# Patient Record
Sex: FEMALE | Race: WHITE | NOT HISPANIC OR LATINO
[De-identification: names, ages, dates, MRNs, and addresses within clinical notes are randomized per-mention and may not be internally consistent; named-entity substitution may affect disease eponyms.]

---

## 2020-07-14 ENCOUNTER — ERX REFILL RESPONSE (OUTPATIENT)
Age: 60
End: 2020-07-14

## 2020-07-14 RX ORDER — URSODIOL 300 MG/1
TAKE 1 CAPSULE (300 MG) BY ORAL ROUTE 2 TIMES PER DAY FOR 90 DAYS CAPSULE ORAL
Qty: 180 CAPSULE | Refills: 4 | OUTPATIENT

## 2020-07-14 RX ORDER — URSODIOL 300 MG/1
TAKE 1 CAPSULE (300 MG) BY ORAL ROUTE 2 TIMES PER DAY FOR 90 DAYS CAPSULE ORAL
Qty: 180 | Refills: 3 | OUTPATIENT

## 2020-07-31 ENCOUNTER — TELEPHONE ENCOUNTER (OUTPATIENT)
Dept: URBAN - METROPOLITAN AREA CLINIC 78 | Facility: CLINIC | Age: 60
End: 2020-07-31

## 2020-08-03 ENCOUNTER — LAB OUTSIDE AN ENCOUNTER (OUTPATIENT)
Dept: URBAN - METROPOLITAN AREA CLINIC 78 | Facility: CLINIC | Age: 60
End: 2020-08-03

## 2020-08-04 ENCOUNTER — TELEPHONE ENCOUNTER (OUTPATIENT)
Dept: URBAN - METROPOLITAN AREA CLINIC 70 | Facility: CLINIC | Age: 60
End: 2020-08-04

## 2020-08-07 LAB
A/G RATIO: 1.6
ALBUMIN: 4.7
ALKALINE PHOSPHATASE: 107
ALT (SGPT): 42
AST (SGOT): 45
BILIRUBIN, TOTAL: 0.2
BUN/CREATININE RATIO: 20
BUN: 21
CALCIUM: 10.2
CARBON DIOXIDE, TOTAL: 24
CHLORIDE: 98
CREATININE: 1.03
EGFR IF AFRICN AM: 70
EGFR IF NONAFRICN AM: 60
GLOBULIN, TOTAL: 2.9
GLUCOSE: 90
HEMATOCRIT: 41.7
HEMOGLOBIN: 14.2
MCH: 30.9
MCHC: 34.1
MCV: 91
MITOCHONDRIAL (M2) ANTIBODY: 136.2
NRBC: (no result)
PLATELETS: 141
POTASSIUM: 4.3
PROTEIN, TOTAL: 7.6
RBC: 4.59
RDW: 11.4
SJOGREN'S ANTI-SS-A: <0.2
SJOGREN'S ANTI-SS-B: <0.2
SODIUM: 137
WBC: 9.5

## 2020-08-19 ENCOUNTER — OFFICE VISIT (OUTPATIENT)
Dept: URBAN - METROPOLITAN AREA TELEHEALTH 2 | Facility: TELEHEALTH | Age: 60
End: 2020-08-19
Payer: COMMERCIAL

## 2020-08-19 DIAGNOSIS — K74.3 PRIMARY BILIARY CHOLANGITIS: ICD-10-CM

## 2020-08-19 PROCEDURE — G8417 CALC BMI ABV UP PARAM F/U: HCPCS | Performed by: INTERNAL MEDICINE

## 2020-08-19 PROCEDURE — 3017F COLORECTAL CA SCREEN DOC REV: CPT | Performed by: INTERNAL MEDICINE

## 2020-08-19 PROCEDURE — 99214 OFFICE O/P EST MOD 30 MIN: CPT | Performed by: INTERNAL MEDICINE

## 2020-08-19 PROCEDURE — G8427 DOCREV CUR MEDS BY ELIG CLIN: HCPCS | Performed by: INTERNAL MEDICINE

## 2020-08-19 RX ORDER — URSODIOL 300 MG/1
TAKE 1 CAPSULE (300 MG) BY ORAL ROUTE 2 TIMES PER DAY FOR 90 DAYS CAPSULE ORAL
Qty: 180 CAPSULE | Refills: 4 | Status: ACTIVE | COMMUNITY

## 2020-08-19 RX ORDER — AMLODIPINE BESYLATE 5 MG/1
UNK TABLET ORAL
Qty: 0 | Refills: 0 | Status: ACTIVE | COMMUNITY
Start: 1900-01-01

## 2020-08-19 RX ORDER — BUPROPION HYDROCHLORIDE 300 MG/1
TAKE 1 TABLET (300 MG) BY ORAL ROUTE ONCE DAILY TABLET, EXTENDED RELEASE ORAL 1
Qty: 0 | Refills: 0 | Status: ACTIVE | COMMUNITY
Start: 1900-01-01

## 2020-08-19 RX ORDER — URSODIOL 300 MG/1
TAKE 1 CAPSULE (300 MG) BY ORAL ROUTE 2 TIMES PER DAY FOR 90 DAYS CAPSULE ORAL
Qty: 180 CAPSULE

## 2020-08-19 NOTE — HPI-OTHER HISTORIES
Patient seen today via telehealth by agreement and consent of patient in light of current COVID-19 pandemic. I used video conferencing during the visit. The patient encounter is appropriate and reasonable under the circumstances given the patient's particular presentation at this time. The patient has been advised of the followin) the potential risks and limitations of this mode of treatment (including but not limited to the absence of in-person examination); 2) the right to refuse telehealth services at any point without affecting the right to future care; 3) the right to receive in-person services, included immediately after this consultation if an urgent need arises; 4) information, including identifiable images or information from this telehealth consult, will only be shared in accordance with HIPPA regulations. Any and all of the patient's and/or patient's family member's questions on this issue have been answered. The patient has verbally consented to be treated via telehealth services. The patient has also been advised to contact this office for worsening conditions or problems, and seek emergency medical treatment and/or call 911 if the patient deems either necessary.   More than half of the face-to-face time used for counseling and coordination of care.

## 2020-10-19 ENCOUNTER — LAB OUTSIDE AN ENCOUNTER (OUTPATIENT)
Dept: URBAN - METROPOLITAN AREA CLINIC 78 | Facility: CLINIC | Age: 60
End: 2020-10-19

## 2020-10-19 ENCOUNTER — TELEPHONE ENCOUNTER (OUTPATIENT)
Dept: URBAN - METROPOLITAN AREA CLINIC 78 | Facility: CLINIC | Age: 60
End: 2020-10-19

## 2020-10-19 PROBLEM — 31712002 PRIMARY BILIARY CIRRHOSIS: Status: ACTIVE | Noted: 2020-10-19

## 2020-11-17 LAB
A/G RATIO: 1.6
ALBUMIN: 4.5
ALKALINE PHOSPHATASE: 81
ALT (SGPT): 33
AST (SGOT): 36
BILIRUBIN, TOTAL: 0.5
BUN/CREATININE RATIO: 20
BUN: 23
CALCIUM: 10
CARBON DIOXIDE, TOTAL: 26
CHLORIDE: 101
CREATININE: 1.15
EGFR AFRICAN AMERICAN: 61
EGFR NON-AFR. AMERICAN: 52
GLOBULIN, TOTAL: 2.8
GLUCOSE: 90
HEMATOCRIT: 41.1
HEMOGLOBIN: 13.8
MCH: 31.2
MCHC: 33.6
MCV: 93
MITOCHONDRIAL (M2) ANTIBODY: 173.2
MPV: 11
PLATELET COUNT: 205
POTASSIUM: 4.4
PROTEIN, TOTAL: 7.3
RDW: 11.8
RED BLOOD CELL COUNT: 4.42
SODIUM: 137
WHITE BLOOD CELL COUNT: 5.7

## 2020-12-02 ENCOUNTER — OFFICE VISIT (OUTPATIENT)
Dept: URBAN - METROPOLITAN AREA CLINIC 78 | Facility: CLINIC | Age: 60
End: 2020-12-02
Payer: COMMERCIAL

## 2020-12-02 ENCOUNTER — LAB OUTSIDE AN ENCOUNTER (OUTPATIENT)
Dept: URBAN - METROPOLITAN AREA CLINIC 78 | Facility: CLINIC | Age: 60
End: 2020-12-02

## 2020-12-02 DIAGNOSIS — K74.3 PBC (PRIMARY BILIARY CIRRHOSIS): ICD-10-CM

## 2020-12-02 DIAGNOSIS — R79.89 ELEVATED LFTS: ICD-10-CM

## 2020-12-02 DIAGNOSIS — L65.9 HAIR LOSS: ICD-10-CM

## 2020-12-02 DIAGNOSIS — R63.5 WEIGHT GAIN: ICD-10-CM

## 2020-12-02 DIAGNOSIS — H04.129 DRY EYES: ICD-10-CM

## 2020-12-02 DIAGNOSIS — R68.2 DRY MOUTH: ICD-10-CM

## 2020-12-02 DIAGNOSIS — D68.61 ANTIPHOSPHOLIPID ANTIBODY SYNDROME: ICD-10-CM

## 2020-12-02 PROCEDURE — G8427 DOCREV CUR MEDS BY ELIG CLIN: HCPCS | Performed by: INTERNAL MEDICINE

## 2020-12-02 PROCEDURE — G8484 FLU IMMUNIZE NO ADMIN: HCPCS | Performed by: INTERNAL MEDICINE

## 2020-12-02 PROCEDURE — G8417 CALC BMI ABV UP PARAM F/U: HCPCS | Performed by: INTERNAL MEDICINE

## 2020-12-02 PROCEDURE — 99214 OFFICE O/P EST MOD 30 MIN: CPT | Performed by: INTERNAL MEDICINE

## 2020-12-02 RX ORDER — BUPROPION HYDROCHLORIDE 300 MG/1
TAKE 1 TABLET (300 MG) BY ORAL ROUTE ONCE DAILY TABLET, EXTENDED RELEASE ORAL 1
Qty: 0 | Refills: 0 | Status: ON HOLD | COMMUNITY
Start: 1900-01-01

## 2020-12-02 RX ORDER — URSODIOL 300 MG/1
TAKE 1 CAPSULE (300 MG) BY ORAL ROUTE 2 TIMES PER DAY FOR 90 DAYS CAPSULE ORAL
Qty: 180 CAPSULE

## 2020-12-02 RX ORDER — AMLODIPINE BESYLATE 5 MG/1
UNK TABLET ORAL
Qty: 0 | Refills: 0 | Status: ON HOLD | COMMUNITY
Start: 1900-01-01

## 2020-12-02 RX ORDER — URSODIOL 300 MG/1
TAKE 1 CAPSULE (300 MG) BY ORAL ROUTE 2 TIMES PER DAY FOR 90 DAYS CAPSULE ORAL
Qty: 180 CAPSULE | Status: ACTIVE | COMMUNITY

## 2020-12-02 NOTE — HPI-TODAY'S VISIT:
Pt is here in f/u Does not have any new complaints Has gained 10 lbs over thelast 6 months Recent labs were d/w pt  Pt c/o thinning of hair  She had a lip bx - neg for Sjogrens Labs for SSA and SSB are neg  Pt recently diagnosed with anti phospholipd syndrome

## 2020-12-05 LAB
A/G RATIO: 1.6
ALBUMIN: 4.4
ALKALINE PHOSPHATASE: 97
ALT (SGPT): 36
AST (SGOT): 42
BILIRUBIN, TOTAL: <0.2
BUN/CREATININE RATIO: 18
BUN: 18
CALCIUM: 9.9
CARBON DIOXIDE, TOTAL: 25
CHLORIDE: 100
CREATININE: 0.99
EGFR IF AFRICN AM: 73
EGFR IF NONAFRICN AM: 63
GLOBULIN, TOTAL: 2.8
GLUCOSE: 81
HEMATOCRIT: 40.8
HEMOGLOBIN: 14.1
MCH: 32.9
MCHC: 34.6
MCV: 95
MITOCHONDRIAL (M2) ANTIBODY: 128.9
NRBC: (no result)
PLATELETS: 191
POTASSIUM: 4.2
PROTEIN, TOTAL: 7.2
RBC: 4.28
RDW: 11.5
SODIUM: 141
T4,FREE(DIRECT): 0.96
TSH: 2.01
WBC: 6.4
ZINC, PLASMA OR SERUM: 80

## 2020-12-22 ENCOUNTER — TELEPHONE ENCOUNTER (OUTPATIENT)
Dept: URBAN - METROPOLITAN AREA CLINIC 78 | Facility: CLINIC | Age: 60
End: 2020-12-22

## 2021-04-06 ENCOUNTER — TELEPHONE ENCOUNTER (OUTPATIENT)
Dept: URBAN - METROPOLITAN AREA CLINIC 78 | Facility: CLINIC | Age: 61
End: 2021-04-06

## 2021-04-09 ENCOUNTER — LAB OUTSIDE AN ENCOUNTER (OUTPATIENT)
Dept: URBAN - METROPOLITAN AREA CLINIC 78 | Facility: CLINIC | Age: 61
End: 2021-04-09

## 2021-04-14 ENCOUNTER — OFFICE VISIT (OUTPATIENT)
Dept: URBAN - METROPOLITAN AREA CLINIC 78 | Facility: CLINIC | Age: 61
End: 2021-04-14

## 2021-04-15 ENCOUNTER — TELEPHONE ENCOUNTER (OUTPATIENT)
Dept: URBAN - METROPOLITAN AREA CLINIC 98 | Facility: CLINIC | Age: 61
End: 2021-04-15

## 2021-04-20 LAB
A/G RATIO: 1.6
ACTIN (SMOOTH MUSCLE) ANTIBODY: 10
ALBUMIN: 5
ALKALINE PHOSPHATASE: 115
ALT (SGPT): 38
ANA DIRECT: NEGATIVE
AST (SGOT): 44
BILIRUBIN, TOTAL: 0.3
BUN/CREATININE RATIO: 22
BUN: 22
CALCIUM: 10.2
CARBON DIOXIDE, TOTAL: 24
CHLORIDE: 99
CREATININE: 1.02
EGFR IF AFRICN AM: 70
EGFR IF NONAFRICN AM: 61
GLOBULIN, TOTAL: 3.1
GLUCOSE: 78
HEMATOCRIT: 42.4
HEMOGLOBIN: 14.6
MCH: 31.9
MCHC: 34.4
MCV: 93
MITOCHONDRIAL (M2) ANTIBODY: 111.2
NRBC: (no result)
PLATELETS: 183
POTASSIUM: 4.4
PROTEIN, TOTAL: 8.1
RBC: 4.57
RDW: 11.6
SJOGREN'S ANTI-SS-A: <0.2
SJOGREN'S ANTI-SS-B: <0.2
SODIUM: 137
WBC: 7.8

## 2021-04-27 ENCOUNTER — LAB OUTSIDE AN ENCOUNTER (OUTPATIENT)
Dept: URBAN - METROPOLITAN AREA CLINIC 78 | Facility: CLINIC | Age: 61
End: 2021-04-27

## 2021-04-27 ENCOUNTER — OFFICE VISIT (OUTPATIENT)
Dept: URBAN - METROPOLITAN AREA CLINIC 78 | Facility: CLINIC | Age: 61
End: 2021-04-27
Payer: COMMERCIAL

## 2021-04-27 DIAGNOSIS — R63.5 WEIGHT GAIN: ICD-10-CM

## 2021-04-27 DIAGNOSIS — K74.3 PRIMARY BILIARY CHOLANGITIS: ICD-10-CM

## 2021-04-27 DIAGNOSIS — R79.89 ELEVATED LFTS: ICD-10-CM

## 2021-04-27 DIAGNOSIS — D68.61 ANTIPHOSPHOLIPID ANTIBODY SYNDROME: ICD-10-CM

## 2021-04-27 PROCEDURE — 99214 OFFICE O/P EST MOD 30 MIN: CPT | Performed by: INTERNAL MEDICINE

## 2021-04-27 RX ORDER — URSODIOL 300 MG/1
TAKE 1 CAPSULE (300 MG) BY ORAL ROUTE 2 TIMES PER DAY FOR 90 DAYS CAPSULE ORAL
Qty: 180 CAPSULE | Status: ACTIVE | COMMUNITY

## 2021-04-27 RX ORDER — AMLODIPINE BESYLATE 5 MG/1
UNK TABLET ORAL
Qty: 0 | Refills: 0 | Status: ON HOLD | COMMUNITY
Start: 1900-01-01

## 2021-04-27 RX ORDER — BUPROPION HYDROCHLORIDE 300 MG/1
TAKE 1 TABLET (300 MG) BY ORAL ROUTE ONCE DAILY TABLET, EXTENDED RELEASE ORAL 1
Qty: 0 | Refills: 0 | Status: ON HOLD | COMMUNITY
Start: 1900-01-01

## 2021-04-27 RX ORDER — URSODIOL 300 MG/1
TAKE 1 CAPSULE (300 MG) BY ORAL ROUTE 2 TIMES PER DAY FOR 90 DAYS CAPSULE ORAL
Qty: 180 CAPSULE | Refills: 0

## 2021-04-27 NOTE — HPI-TODAY'S VISIT:
Ptis here in f/u She has no new complaints  She has gained 10 lbs since last visit Her TSH is wnl  recent labs show stable AMA and LFTs

## 2021-05-21 ENCOUNTER — TELEPHONE ENCOUNTER (OUTPATIENT)
Dept: URBAN - METROPOLITAN AREA CLINIC 92 | Facility: CLINIC | Age: 61
End: 2021-05-21

## 2021-05-21 RX ORDER — URSODIOL 300 MG/1
1 CAPSULE ORALLY TWICE A DAY CAPSULE ORAL
Qty: 180 | Refills: 3

## 2021-07-23 LAB
A/G RATIO: 1.8
ACTIN (SMOOTH MUSCLE) ANTIBODY: 9
ALBUMIN: 4.8
ALKALINE PHOSPHATASE: 91
ALT (SGPT): 37
ANA DIRECT: NEGATIVE
AST (SGOT): 38
BILIRUBIN, TOTAL: 0.3
BUN/CREATININE RATIO: 18
BUN: 19
CALCIUM: 10.4
CARBON DIOXIDE, TOTAL: 22
CHLORIDE: 101
CREATININE: 1.05
EGFR IF AFRICN AM: 68
EGFR IF NONAFRICN AM: 59
GLOBULIN, TOTAL: 2.7
GLUCOSE: 98
HEMATOCRIT: 45
HEMOGLOBIN: 15.1
MCH: 32.4
MCHC: 33.6
MCV: 97
MITOCHONDRIAL (M2) ANTIBODY: 115.6
NRBC: (no result)
PLATELETS: 215
POTASSIUM: 5.2
PROTEIN, TOTAL: 7.5
RBC: 4.66
RDW: 11.7
SJOGREN'S ANTI-SS-A: <0.2
SJOGREN'S ANTI-SS-B: <0.2
SODIUM: 138
WBC: 6.1

## 2021-07-27 ENCOUNTER — LAB OUTSIDE AN ENCOUNTER (OUTPATIENT)
Dept: URBAN - METROPOLITAN AREA CLINIC 78 | Facility: CLINIC | Age: 61
End: 2021-07-27

## 2021-07-27 ENCOUNTER — OFFICE VISIT (OUTPATIENT)
Dept: URBAN - METROPOLITAN AREA CLINIC 78 | Facility: CLINIC | Age: 61
End: 2021-07-27
Payer: COMMERCIAL

## 2021-07-27 DIAGNOSIS — R68.2 DRY MOUTH: ICD-10-CM

## 2021-07-27 DIAGNOSIS — K74.3 PRIMARY BILIARY CHOLANGITIS: ICD-10-CM

## 2021-07-27 DIAGNOSIS — H04.129 DRY EYES: ICD-10-CM

## 2021-07-27 DIAGNOSIS — L65.9 HAIR LOSS: ICD-10-CM

## 2021-07-27 DIAGNOSIS — R79.89 ELEVATED SERUM CREATININE: ICD-10-CM

## 2021-07-27 DIAGNOSIS — D68.61 ANTIPHOSPHOLIPID ANTIBODY SYNDROME: ICD-10-CM

## 2021-07-27 PROCEDURE — 99214 OFFICE O/P EST MOD 30 MIN: CPT | Performed by: INTERNAL MEDICINE

## 2021-07-27 RX ORDER — AMLODIPINE BESYLATE 5 MG/1
UNK TABLET ORAL
Qty: 0 | Refills: 0 | Status: ON HOLD | COMMUNITY
Start: 1900-01-01

## 2021-07-27 RX ORDER — BUPROPION HYDROCHLORIDE 300 MG/1
TAKE 1 TABLET (300 MG) BY ORAL ROUTE ONCE DAILY TABLET, EXTENDED RELEASE ORAL 1
Qty: 0 | Refills: 0 | Status: ON HOLD | COMMUNITY
Start: 1900-01-01

## 2021-07-27 RX ORDER — URSODIOL 300 MG/1
TAKE 1 CAPSULE (300 MG) BY ORAL ROUTE 2 TIMES PER DAY FOR 90 DAYS CAPSULE ORAL
Qty: 180 CAPSULE | Refills: 0

## 2021-07-27 RX ORDER — URSODIOL 300 MG/1
1 CAPSULE ORALLY TWICE A DAY CAPSULE ORAL
Qty: 180 | Refills: 3 | Status: ACTIVE | COMMUNITY
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
## 2021-10-25 LAB
A/G RATIO: 1.8
ALBUMIN: 4.9
ALKALINE PHOSPHATASE: 110
ALT (SGPT): 43
AST (SGOT): 43
BILIRUBIN, TOTAL: 0.2
BUN/CREATININE RATIO: 20
BUN: 17
CALCIUM: 10.1
CARBON DIOXIDE, TOTAL: 25
CHLORIDE: 99
CREATININE: 0.83
EGFR IF AFRICN AM: 89
EGFR IF NONAFRICN AM: 77
GLOBULIN, TOTAL: 2.7
GLUCOSE: 82
HEMATOCRIT: 42.6
HEMOGLOBIN: 14.2
MCH: 31.4
MCHC: 33.3
MCV: 94
MITOCHONDRIAL (M2) ANTIBODY: 137.7
NRBC: (no result)
PLATELETS: 218
POTASSIUM: 4.3
PROTEIN, TOTAL: 7.6
RBC: 4.52
RDW: 11.6
SJOGREN'S ANTI-SS-A: <0.2
SJOGREN'S ANTI-SS-B: <0.2
SODIUM: 139
WBC: 5.8

## 2021-10-27 ENCOUNTER — LAB OUTSIDE AN ENCOUNTER (OUTPATIENT)
Dept: URBAN - METROPOLITAN AREA CLINIC 78 | Facility: CLINIC | Age: 61
End: 2021-10-27

## 2021-10-27 ENCOUNTER — OFFICE VISIT (OUTPATIENT)
Dept: URBAN - METROPOLITAN AREA CLINIC 78 | Facility: CLINIC | Age: 61
End: 2021-10-27
Payer: COMMERCIAL

## 2021-10-27 DIAGNOSIS — R79.89 ELEVATED LFTS: ICD-10-CM

## 2021-10-27 DIAGNOSIS — K74.3 PBC (PRIMARY BILIARY CIRRHOSIS): ICD-10-CM

## 2021-10-27 DIAGNOSIS — K74.3 PRIMARY BILIARY CHOLANGITIS: ICD-10-CM

## 2021-10-27 DIAGNOSIS — H04.129 DRY EYES: ICD-10-CM

## 2021-10-27 DIAGNOSIS — D68.61 ANTIPHOSPHOLIPID ANTIBODY SYNDROME: ICD-10-CM

## 2021-10-27 DIAGNOSIS — R63.5 WEIGHT GAIN: ICD-10-CM

## 2021-10-27 DIAGNOSIS — L65.9 HAIR LOSS: ICD-10-CM

## 2021-10-27 DIAGNOSIS — R68.2 DRY MOUTH: ICD-10-CM

## 2021-10-27 PROCEDURE — 99214 OFFICE O/P EST MOD 30 MIN: CPT | Performed by: INTERNAL MEDICINE

## 2021-10-27 RX ORDER — AMLODIPINE BESYLATE 5 MG/1
UNK TABLET ORAL
Qty: 0 | Refills: 0 | Status: ON HOLD | COMMUNITY
Start: 1900-01-01

## 2021-10-27 RX ORDER — URSODIOL 300 MG/1
TAKE 1 CAPSULE (300 MG) BY ORAL ROUTE 2 TIMES PER DAY FOR 90 DAYS CAPSULE ORAL
Qty: 180 CAPSULE | Refills: 0 | Status: ACTIVE | COMMUNITY

## 2021-10-27 RX ORDER — URSODIOL 300 MG/1
TAKE 1 CAPSULE (300 MG) BY ORAL ROUTE 2 TIMES PER DAY FOR 90 DAYS CAPSULE ORAL
Qty: 180 CAPSULE | Refills: 0

## 2021-10-27 RX ORDER — BUPROPION HYDROCHLORIDE 300 MG/1
TAKE 1 TABLET (300 MG) BY ORAL ROUTE ONCE DAILY TABLET, EXTENDED RELEASE ORAL 1
Qty: 0 | Refills: 0 | Status: ON HOLD | COMMUNITY
Start: 1900-01-01

## 2021-10-27 NOTE — HPI-TODAY'S VISIT:
Ptis here in f/u Recently she has been having increased itching - cal at night  AST 43 ALT 43 M2 137.7

## 2022-01-25 ENCOUNTER — ERX REFILL RESPONSE (OUTPATIENT)
Dept: URBAN - METROPOLITAN AREA CLINIC 78 | Facility: CLINIC | Age: 62
End: 2022-01-25

## 2022-01-25 RX ORDER — URSODIOL 300 MG/1
TAKE 1 CAPSULE (300 MG) BY ORAL ROUTE 2 TIMES PER DAY FOR 90 DAYS CAPSULE ORAL
Qty: 180 CAPSULE | Refills: 0 | OUTPATIENT

## 2022-01-25 RX ORDER — URSODIOL 300 MG/1
TAKE 1 CAPSULE BY MOUTH  TWICE DAILY CAPSULE ORAL
Qty: 180 CAPSULE | Refills: 4 | OUTPATIENT

## 2022-01-26 LAB
A/G RATIO: 1.7
ACTIN (SMOOTH MUSCLE) ANTIBODY: 8
ALBUMIN: 4.3
ALKALINE PHOSPHATASE: 96
ALT (SGPT): 33
ANA DIRECT: NEGATIVE
AST (SGOT): 39
BILIRUBIN, TOTAL: 0.2
BUN/CREATININE RATIO: 22
BUN: 22
CALCIUM: 9.9
CARBON DIOXIDE, TOTAL: 24
CHLORIDE: 99
CREATININE: 1.02
EGFR IF AFRICN AM: 70
EGFR IF NONAFRICN AM: 60
GLOBULIN, TOTAL: 2.6
GLUCOSE: 89
HEMATOCRIT: 39.9
HEMOGLOBIN: 13.9
MCH: 32.3
MCHC: 34.8
MCV: 93
MITOCHONDRIAL (M2) ANTIBODY: 124.3
NRBC: (no result)
PLATELETS: 257
POTASSIUM: 4.3
PROTEIN, TOTAL: 6.9
RBC: 4.3
RDW: 11.4
SJOGREN'S ANTI-SS-A: <0.2
SJOGREN'S ANTI-SS-B: <0.2
SODIUM: 137
T4,FREE(DIRECT): 1.21
TSH: 1.79
WBC: 5.9

## 2022-01-28 ENCOUNTER — LAB OUTSIDE AN ENCOUNTER (OUTPATIENT)
Dept: URBAN - METROPOLITAN AREA CLINIC 78 | Facility: CLINIC | Age: 62
End: 2022-01-28

## 2022-01-28 ENCOUNTER — OFFICE VISIT (OUTPATIENT)
Dept: URBAN - METROPOLITAN AREA CLINIC 78 | Facility: CLINIC | Age: 62
End: 2022-01-28
Payer: COMMERCIAL

## 2022-01-28 DIAGNOSIS — R63.5 WEIGHT GAIN: ICD-10-CM

## 2022-01-28 DIAGNOSIS — R68.2 DRY MOUTH: ICD-10-CM

## 2022-01-28 DIAGNOSIS — K59.00 CONSTIPATION: ICD-10-CM

## 2022-01-28 DIAGNOSIS — H04.129 DRY EYES: ICD-10-CM

## 2022-01-28 DIAGNOSIS — K74.3 PRIMARY BILIARY CHOLANGITIS: ICD-10-CM

## 2022-01-28 DIAGNOSIS — R07.89 CHEST DISCOMFORT: ICD-10-CM

## 2022-01-28 DIAGNOSIS — R79.89 ELEVATED LFTS: ICD-10-CM

## 2022-01-28 DIAGNOSIS — D68.61 ANTIPHOSPHOLIPID ANTIBODY SYNDROME: ICD-10-CM

## 2022-01-28 DIAGNOSIS — L65.9 HAIR LOSS: ICD-10-CM

## 2022-01-28 PROCEDURE — 99214 OFFICE O/P EST MOD 30 MIN: CPT | Performed by: INTERNAL MEDICINE

## 2022-01-28 RX ORDER — BUPROPION HYDROCHLORIDE 300 MG/1
TAKE 1 TABLET (300 MG) BY ORAL ROUTE ONCE DAILY TABLET, EXTENDED RELEASE ORAL 1
Qty: 0 | Refills: 0 | Status: ON HOLD | COMMUNITY
Start: 1900-01-01

## 2022-01-28 RX ORDER — URSODIOL 300 MG/1
TAKE 1 CAPSULE BY MOUTH  TWICE DAILY CAPSULE ORAL
Qty: 180 CAPSULE | Refills: 4 | Status: ACTIVE | COMMUNITY

## 2022-01-28 RX ORDER — URSODIOL 300 MG/1
1 CAPSULE ORALLY TWICE A DAY CAPSULE ORAL
Qty: 180 | Refills: 3

## 2022-01-28 RX ORDER — AMLODIPINE BESYLATE 5 MG/1
UNK TABLET ORAL
Qty: 0 | Refills: 0 | Status: ON HOLD | COMMUNITY
Start: 1900-01-01

## 2022-01-28 NOTE — HPI-TODAY'S VISIT:
Ptis here in f/u Recently she has been having increased itching - cal at night    She has been feeling fatigued lately She feels exhausted but does not sleep well She wakes up every 2 hours She has infrequent dizziness Her skin itching has returned - but is mild and intermittent She had labs done at her PCP   AST - nl  ALT - 33 M2 124.3   Pt has a constipation dominant habit - she doesnt use anything for the same  Recently she has been having chest discomfort - it is left sided, intermittent and very transient

## 2022-04-22 ENCOUNTER — OFFICE VISIT (OUTPATIENT)
Dept: URBAN - METROPOLITAN AREA CLINIC 78 | Facility: CLINIC | Age: 62
End: 2022-04-22

## 2022-04-23 LAB
A/G RATIO: 1.7
ACTIN (SMOOTH MUSCLE) ANTIBODY: 7
ALBUMIN: 4.5
ALKALINE PHOSPHATASE: 93
ALT (SGPT): 34
ANA DIRECT: NEGATIVE
AST (SGOT): 40
BILIRUBIN, TOTAL: 0.3
BUN/CREATININE RATIO: 22
BUN: 21
CALCIUM: 10.1
CARBON DIOXIDE, TOTAL: 22
CHLORIDE: 100
CREATININE: 0.94
EGFR: 70
GLOBULIN, TOTAL: 2.6
GLUCOSE: 95
HEMATOCRIT: 40.9
HEMOGLOBIN: 14
MCH: 32.3
MCHC: 34.2
MCV: 94
MITOCHONDRIAL (M2) ANTIBODY: 131.5
NRBC: (no result)
PLATELETS: 246
POTASSIUM: 4.4
PROTEIN, TOTAL: 7.1
RBC: 4.34
RDW: 11.6
SJOGREN'S ANTI-SS-A: <0.2
SJOGREN'S ANTI-SS-B: <0.2
SODIUM: 139
T4,FREE(DIRECT): 1.1
TSH: 1.62
WBC: 5.6
ZINC, PLASMA OR SERUM: 74

## 2022-06-30 ENCOUNTER — TELEPHONE ENCOUNTER (OUTPATIENT)
Dept: URBAN - METROPOLITAN AREA CLINIC 78 | Facility: CLINIC | Age: 62
End: 2022-06-30

## 2022-07-30 LAB
A/G RATIO: 1.9
ACTIN (SMOOTH MUSCLE) ANTIBODY: 9
ALBUMIN: 4.8
ALKALINE PHOSPHATASE: 101
ALT (SGPT): 35
AST (SGOT): 41
BILIRUBIN, TOTAL: 0.3
BUN/CREATININE RATIO: 18
BUN: 21
CALCIUM: 10.3
CARBON DIOXIDE, TOTAL: 22
CHLORIDE: 100
CREATININE: 1.17
EGFR: 54
GLOBULIN, TOTAL: 2.5
GLUCOSE: 88
HEMATOCRIT: 42.2
HEMOGLOBIN: 14.3
MCH: 32.1
MCHC: 33.9
MCV: 95
MITOCHONDRIAL (M2) ANTIBODY: 136.5
NRBC: (no result)
PLATELETS: 233
POTASSIUM: 4.5
PROTEIN, TOTAL: 7.3
RBC: 4.45
RDW: 11.7
SJOGREN'S ANTI-SS-A: <0.2
SJOGREN'S ANTI-SS-B: <0.2
SODIUM: 138
T4,FREE(DIRECT): 1.2
TSH: 1.78
WBC: 5.7
ZINC, PLASMA OR SERUM: 79

## 2022-08-05 ENCOUNTER — TELEPHONE ENCOUNTER (OUTPATIENT)
Dept: URBAN - METROPOLITAN AREA CLINIC 78 | Facility: CLINIC | Age: 62
End: 2022-08-05

## 2022-08-05 ENCOUNTER — TELEPHONE ENCOUNTER (OUTPATIENT)
Dept: URBAN - METROPOLITAN AREA CLINIC 6 | Facility: CLINIC | Age: 62
End: 2022-08-05

## 2022-08-10 ENCOUNTER — OFFICE VISIT (OUTPATIENT)
Dept: URBAN - METROPOLITAN AREA CLINIC 78 | Facility: CLINIC | Age: 62
End: 2022-08-10
Payer: COMMERCIAL

## 2022-08-10 VITALS
TEMPERATURE: 97.65 F | BODY MASS INDEX: 23.45 KG/M2 | HEART RATE: 64 BPM | DIASTOLIC BLOOD PRESSURE: 89 MMHG | HEIGHT: 61 IN | RESPIRATION RATE: 16 BRPM | SYSTOLIC BLOOD PRESSURE: 147 MMHG | WEIGHT: 124.2 LBS

## 2022-08-10 DIAGNOSIS — K74.3 PRIMARY BILIARY CHOLANGITIS: ICD-10-CM

## 2022-08-10 DIAGNOSIS — E83.52 HYPERCALCEMIA: ICD-10-CM

## 2022-08-10 DIAGNOSIS — L65.9 HAIR LOSS: ICD-10-CM

## 2022-08-10 DIAGNOSIS — R63.5 WEIGHT GAIN: ICD-10-CM

## 2022-08-10 DIAGNOSIS — D68.61 ANTIPHOSPHOLIPID ANTIBODY SYNDROME: ICD-10-CM

## 2022-08-10 DIAGNOSIS — H04.129 DRY EYES: ICD-10-CM

## 2022-08-10 DIAGNOSIS — R79.89 ELEVATED LFTS: ICD-10-CM

## 2022-08-10 DIAGNOSIS — K59.00 CONSTIPATION: ICD-10-CM

## 2022-08-10 DIAGNOSIS — R68.2 DRY MOUTH: ICD-10-CM

## 2022-08-10 DIAGNOSIS — N28.9 LOW KIDNEY FUNCTION: ICD-10-CM

## 2022-08-10 PROBLEM — 162290004 DRY EYES: Status: ACTIVE | Noted: 2020-12-02

## 2022-08-10 PROBLEM — 66931009: Status: ACTIVE | Noted: 2022-08-10

## 2022-08-10 PROBLEM — 26843008 ANTIPHOSPHOLIPID SYNDROME: Status: ACTIVE | Noted: 2020-12-02

## 2022-08-10 PROBLEM — 14760008 CONSTIPATION: Status: ACTIVE | Noted: 2022-01-28

## 2022-08-10 PROCEDURE — 99214 OFFICE O/P EST MOD 30 MIN: CPT | Performed by: INTERNAL MEDICINE

## 2022-08-10 RX ORDER — URSODIOL 300 MG/1
1 CAPSULE ORALLY TWICE A DAY CAPSULE ORAL
Qty: 180 | Refills: 3 | Status: ACTIVE | COMMUNITY

## 2022-08-10 RX ORDER — URSODIOL 300 MG/1
TAKE 1 CAPSULE BY MOUTH  TWICE DAILY CAPSULE ORAL
Qty: 180 CAPSULE | Refills: 0

## 2022-08-10 RX ORDER — URSODIOL 300 MG/1
TAKE 1 CAPSULE BY MOUTH  TWICE DAILY CAPSULE ORAL
Qty: 180 CAPSULE | Refills: 4 | Status: ACTIVE | COMMUNITY

## 2022-08-10 RX ORDER — BUPROPION HYDROCHLORIDE 300 MG/1
TAKE 1 TABLET (300 MG) BY ORAL ROUTE ONCE DAILY TABLET, EXTENDED RELEASE ORAL 1
Qty: 0 | Refills: 0 | Status: ON HOLD | COMMUNITY
Start: 1900-01-01

## 2022-08-10 RX ORDER — AMLODIPINE BESYLATE 5 MG/1
UNK TABLET ORAL
Qty: 0 | Refills: 0 | Status: ON HOLD | COMMUNITY
Start: 1900-01-01

## 2022-08-10 NOTE — HPI-TODAY'S VISIT:
1     Ptis here in f/u Recently she has been having increased itching - cal at night    She has been feeling fatigued lately She feels exhausted but does not sleep well She wakes up every 2 hours She has infrequent dizziness Her skin itching has returned - but is mild and intermittent She had labs done at her PCP   Pt has a constipation dominant habit - she doesnt use anything for the same  Recently she has been having chest discomfort - it is left sided, intermittent and very transient

## 2022-08-11 LAB — PTH, INTACT: 36

## 2022-08-15 ENCOUNTER — OFFICE VISIT (OUTPATIENT)
Dept: URBAN - METROPOLITAN AREA CLINIC 78 | Facility: CLINIC | Age: 62
End: 2022-08-15

## 2022-09-07 ENCOUNTER — OFFICE VISIT (OUTPATIENT)
Dept: URBAN - METROPOLITAN AREA CLINIC 78 | Facility: CLINIC | Age: 62
End: 2022-09-07

## 2022-10-10 ENCOUNTER — OFFICE VISIT (OUTPATIENT)
Dept: URBAN - METROPOLITAN AREA CLINIC 78 | Facility: CLINIC | Age: 62
End: 2022-10-10

## 2022-11-01 ENCOUNTER — TELEPHONE ENCOUNTER (OUTPATIENT)
Dept: URBAN - METROPOLITAN AREA CLINIC 78 | Facility: CLINIC | Age: 62
End: 2022-11-01

## 2022-12-05 LAB
A/G RATIO: 1.7
ACTIN (SMOOTH MUSCLE) ANTIBODY: 7
ALBUMIN: 4.5
ALKALINE PHOSPHATASE: 124
ALT (SGPT): 49
ANA DIRECT: NEGATIVE
AST (SGOT): 46
BILIRUBIN, TOTAL: 0.3
BUN/CREATININE RATIO: 26
BUN: 24
CALCIUM: 10
CARBON DIOXIDE, TOTAL: 22
CHLORIDE: 99
CREATININE: 0.91
EGFR: 72
GLOBULIN, TOTAL: 2.7
GLUCOSE: 91
HEMATOCRIT: 42.4
HEMOGLOBIN: 13.9
MCH: 31.2
MCHC: 32.8
MCV: 95
MITOCHONDRIAL (M2) ANTIBODY: 161
NRBC: (no result)
PLATELETS: 280
POTASSIUM: 4.7
PROTEIN, TOTAL: 7.2
RBC: 4.46
RDW: 11.5
SJOGREN'S ANTI-SS-A: <0.2
SJOGREN'S ANTI-SS-B: <0.2
SODIUM: 135
T4,FREE(DIRECT): 1.12
TSH: 1.8
WBC: 6.8
ZINC, PLASMA OR SERUM: 125

## 2022-12-09 ENCOUNTER — LAB OUTSIDE AN ENCOUNTER (OUTPATIENT)
Dept: URBAN - METROPOLITAN AREA CLINIC 78 | Facility: CLINIC | Age: 62
End: 2022-12-09

## 2022-12-09 ENCOUNTER — OFFICE VISIT (OUTPATIENT)
Dept: URBAN - METROPOLITAN AREA CLINIC 78 | Facility: CLINIC | Age: 62
End: 2022-12-09
Payer: COMMERCIAL

## 2022-12-09 VITALS
HEART RATE: 64 BPM | RESPIRATION RATE: 17 BRPM | BODY MASS INDEX: 22.96 KG/M2 | DIASTOLIC BLOOD PRESSURE: 78 MMHG | WEIGHT: 121.6 LBS | TEMPERATURE: 98.1 F | HEIGHT: 61 IN | SYSTOLIC BLOOD PRESSURE: 148 MMHG

## 2022-12-09 DIAGNOSIS — Z86.010 PERSONAL HISTORY OF COLONIC POLYPS: ICD-10-CM

## 2022-12-09 DIAGNOSIS — L65.9 HAIR LOSS: ICD-10-CM

## 2022-12-09 DIAGNOSIS — Z12.11 COLON CANCER SCREENING: ICD-10-CM

## 2022-12-09 DIAGNOSIS — R63.5 WEIGHT GAIN: ICD-10-CM

## 2022-12-09 DIAGNOSIS — K74.3 PBC (PRIMARY BILIARY CIRRHOSIS): ICD-10-CM

## 2022-12-09 PROBLEM — 428283002: Status: ACTIVE | Noted: 2022-12-09

## 2022-12-09 PROBLEM — 31712002 PRIMARY BILIARY CHOLANGITIS: Status: ACTIVE | Noted: 2020-10-19

## 2022-12-09 PROBLEM — 1761006 BILIARY CIRRHOSIS: Status: ACTIVE | Noted: 2020-12-02

## 2022-12-09 PROCEDURE — 99214 OFFICE O/P EST MOD 30 MIN: CPT | Performed by: INTERNAL MEDICINE

## 2022-12-09 RX ORDER — AMLODIPINE BESYLATE 5 MG/1
UNK TABLET ORAL
Qty: 0 | Refills: 0 | Status: ON HOLD | COMMUNITY
Start: 1900-01-01

## 2022-12-09 RX ORDER — SODIUM PICOSULFATE, MAGNESIUM OXIDE, AND ANHYDROUS CITRIC ACID 10; 3.5; 12 MG/160ML; G/160ML; G/160ML
160 ML LIQUID ORAL
Qty: 1 PACK | Refills: 0 | OUTPATIENT
Start: 2022-12-09 | End: 2022-12-10

## 2022-12-09 RX ORDER — BUPROPION HYDROCHLORIDE 300 MG/1
TAKE 1 TABLET (300 MG) BY ORAL ROUTE ONCE DAILY TABLET, EXTENDED RELEASE ORAL 1
Qty: 0 | Refills: 0 | Status: ON HOLD | COMMUNITY
Start: 1900-01-01

## 2022-12-09 RX ORDER — URSODIOL 300 MG/1
1 CAPSULE ORALLY TWICE A DAY CAPSULE ORAL
Qty: 180 | Refills: 3 | Status: ACTIVE | COMMUNITY

## 2022-12-09 RX ORDER — URSODIOL 300 MG/1
2 TABLETS IN AM AND 1 TABLET IN PM CAPSULE ORAL
Qty: 270 TABLET | Refills: 3

## 2022-12-09 RX ORDER — URSODIOL 300 MG/1
TAKE 1 CAPSULE BY MOUTH  TWICE DAILY CAPSULE ORAL
Qty: 180 CAPSULE | Refills: 0 | Status: ON HOLD | COMMUNITY

## 2022-12-09 NOTE — HPI-TODAY'S VISIT:
Ptis here in f/u Recently she has been having increased itching - cal at night    She has been feeling fatigued lately She feels exhausted but does not sleep well She wakes up every 2 hours She has infrequent dizziness Her skin itching has returned - but is mild and intermittent She had labs done at her PCP   Pt has a constipation dominant habit - she doesnt use anything for the same  Recently she has been having chest discomfort - it is left sided, intermittent and very transient  Pt had a colonoscopy many years ago She was told she had polyps She has been asked to f/u in 3 yrs and she is 2 years behind schedule

## 2022-12-09 NOTE — HPI-OTHER HISTORIES
M2 - 136 - > 161 Cr - 1.17 - > 0.91 eGFR - 54 Ca -10.3 - > 10.0 AST - 41 - > 46 ALT - 35 - > 49 Alk Phos - > 124

## 2023-01-13 ENCOUNTER — TELEPHONE ENCOUNTER (OUTPATIENT)
Dept: URBAN - METROPOLITAN AREA CLINIC 78 | Facility: CLINIC | Age: 63
End: 2023-01-13

## 2023-01-19 ENCOUNTER — CLAIMS CREATED FROM THE CLAIM WINDOW (OUTPATIENT)
Dept: URBAN - METROPOLITAN AREA CLINIC 4 | Facility: CLINIC | Age: 63
End: 2023-01-19
Payer: COMMERCIAL

## 2023-01-19 ENCOUNTER — CLAIMS CREATED FROM THE CLAIM WINDOW (OUTPATIENT)
Dept: URBAN - METROPOLITAN AREA SURGERY CENTER 15 | Facility: SURGERY CENTER | Age: 63
End: 2023-01-19

## 2023-01-19 ENCOUNTER — CLAIMS CREATED FROM THE CLAIM WINDOW (OUTPATIENT)
Dept: URBAN - METROPOLITAN AREA SURGERY CENTER 15 | Facility: SURGERY CENTER | Age: 63
End: 2023-01-19
Payer: COMMERCIAL

## 2023-01-19 DIAGNOSIS — Z12.11 COLON CANCER SCREENING: ICD-10-CM

## 2023-01-19 DIAGNOSIS — K63.89 BACTERIAL OVERGROWTH SYNDROME: ICD-10-CM

## 2023-01-19 DIAGNOSIS — K63.89 OTHER SPECIFIED DISEASES OF INTESTINE: ICD-10-CM

## 2023-01-19 PROCEDURE — 88305 TISSUE EXAM BY PATHOLOGIST: CPT | Performed by: PATHOLOGY

## 2023-01-19 PROCEDURE — 45380 COLONOSCOPY AND BIOPSY: CPT | Performed by: INTERNAL MEDICINE

## 2023-01-19 PROCEDURE — G8907 PT DOC NO EVENTS ON DISCHARG: HCPCS | Performed by: INTERNAL MEDICINE

## 2023-02-07 ENCOUNTER — TELEPHONE ENCOUNTER (OUTPATIENT)
Dept: URBAN - METROPOLITAN AREA CLINIC 78 | Facility: CLINIC | Age: 63
End: 2023-02-07

## 2023-04-02 LAB
A/G RATIO: 1.4
ALBUMIN: 4.3
ALKALINE PHOSPHATASE: 112
ALT (SGPT): 42
AST (SGOT): 50
BILIRUBIN, TOTAL: 0.3
BUN/CREATININE RATIO: 16
BUN: 16
CALCIUM: 10
CARBON DIOXIDE, TOTAL: 20
CHLORIDE: 102
CREATININE: 1.01
EGFR: 64
GLOBULIN, TOTAL: 3
GLUCOSE: 97
HEMATOCRIT: 40.6
HEMOGLOBIN: 13.4
MCH: 31.2
MCHC: 33
MCV: 94
MITOCHONDRIAL (M2) ANTIBODY: 162.2
NRBC: (no result)
PLATELETS: 276
POTASSIUM: 4.3
PROTEIN, TOTAL: 7.3
RBC: 4.3
RDW: 11.6
SODIUM: 140
WBC: 5.4

## 2023-05-03 ENCOUNTER — OFFICE VISIT (OUTPATIENT)
Dept: URBAN - METROPOLITAN AREA CLINIC 78 | Facility: CLINIC | Age: 63
End: 2023-05-03

## 2023-05-12 ENCOUNTER — OFFICE VISIT (OUTPATIENT)
Dept: URBAN - METROPOLITAN AREA CLINIC 78 | Facility: CLINIC | Age: 63
End: 2023-05-12
Payer: COMMERCIAL

## 2023-05-12 VITALS
SYSTOLIC BLOOD PRESSURE: 157 MMHG | HEART RATE: 54 BPM | DIASTOLIC BLOOD PRESSURE: 90 MMHG | TEMPERATURE: 97.1 F | HEIGHT: 61 IN | WEIGHT: 118 LBS | BODY MASS INDEX: 22.28 KG/M2

## 2023-05-12 DIAGNOSIS — K74.3 PRIMARY BILIARY CHOLANGITIS: ICD-10-CM

## 2023-05-12 DIAGNOSIS — R68.2 DRY MOUTH, UNSPECIFIED: ICD-10-CM

## 2023-05-12 DIAGNOSIS — H04.123 DRY EYE SYNDROME OF BILATERAL LACRIMAL GLANDS: ICD-10-CM

## 2023-05-12 PROBLEM — 95766002: Status: ACTIVE | Noted: 2023-05-12

## 2023-05-12 PROCEDURE — 99214 OFFICE O/P EST MOD 30 MIN: CPT | Performed by: INTERNAL MEDICINE

## 2023-05-12 RX ORDER — BUPROPION HYDROCHLORIDE 300 MG/1
TAKE 1 TABLET (300 MG) BY ORAL ROUTE ONCE DAILY TABLET, EXTENDED RELEASE ORAL 1
Qty: 0 | Refills: 0 | Status: ON HOLD | COMMUNITY
Start: 1900-01-01

## 2023-05-12 RX ORDER — URSODIOL 300 MG/1
TAKE 1 CAPSULE BY MOUTH  TWICE DAILY CAPSULE ORAL
Qty: 180 CAPSULE | Refills: 0 | Status: ON HOLD | COMMUNITY

## 2023-05-12 RX ORDER — URSODIOL 300 MG/1
2 TABLETS IN AM AND 1 TABLET IN PM CAPSULE ORAL
Qty: 270 TABLET | Refills: 3 | Status: ACTIVE | COMMUNITY

## 2023-05-12 RX ORDER — AMLODIPINE BESYLATE 5 MG/1
UNK TABLET ORAL
Qty: 0 | Refills: 0 | Status: ON HOLD | COMMUNITY
Start: 1900-01-01

## 2023-05-12 RX ORDER — URSODIOL 300 MG/1
2 TABLETS IN AM AND 1 TABLET IN PM CAPSULE ORAL
Qty: 270 TABLET | Refills: 3

## 2023-05-12 NOTE — HPI-TODAY'S VISIT:
Patient is here in f/u She says the generalized itching is slowly resolving She does c/o dry mouth She also c/o dry eyes SHe has to keep drinking water both during the day and night She claims she has been told she is neg for Sjogrens BUt she has been recently found to have elevated ACE levels

## 2023-06-28 LAB
A/G RATIO: 1.8
ACE: 92
ACTIN (SMOOTH MUSCLE) ANTIBODY: 7
ALBUMIN: 4.6
ALKALINE PHOSPHATASE: 104
ALT (SGPT): 49
ANA DIRECT: NEGATIVE
AST (SGOT): 52
BILIRUBIN, TOTAL: 0.2
BUN/CREATININE RATIO: 15
BUN: 16
CALCIUM: 10.2
CARBON DIOXIDE, TOTAL: 24
CHLORIDE: 103
CREATININE: 1.04
EGFR: 62
GLOBULIN, TOTAL: 2.6
GLUCOSE: 75
HEMATOCRIT: 40.8
HEMOGLOBIN: 13.8
MCH: 32.1
MCHC: 33.8
MCV: 95
MITOCHONDRIAL (M2) ANTIBODY: 152.7
NRBC: (no result)
PLATELETS: 267
POTASSIUM: 4.4
PROTEIN, TOTAL: 7.2
RBC: 4.3
RDW: 11.8
SODIUM: 141
WBC: 5.6

## 2023-07-19 ENCOUNTER — OFFICE VISIT (OUTPATIENT)
Dept: URBAN - METROPOLITAN AREA CLINIC 78 | Facility: CLINIC | Age: 63
End: 2023-07-19

## 2023-07-19 RX ORDER — URSODIOL 300 MG/1
TAKE 1 CAPSULE BY MOUTH  TWICE DAILY CAPSULE ORAL
Qty: 180 CAPSULE | Refills: 0 | Status: ON HOLD | COMMUNITY

## 2023-07-19 RX ORDER — URSODIOL 300 MG/1
2 TABLETS IN AM AND 1 TABLET IN PM CAPSULE ORAL
Qty: 270 TABLET | Refills: 3 | Status: ACTIVE | COMMUNITY

## 2023-07-19 RX ORDER — AMLODIPINE BESYLATE 5 MG/1
UNK TABLET ORAL
Qty: 0 | Refills: 0 | Status: ON HOLD | COMMUNITY
Start: 1900-01-01

## 2023-07-19 RX ORDER — BUPROPION HYDROCHLORIDE 300 MG/1
TAKE 1 TABLET (300 MG) BY ORAL ROUTE ONCE DAILY TABLET, EXTENDED RELEASE ORAL 1
Qty: 0 | Refills: 0 | Status: ON HOLD | COMMUNITY
Start: 1900-01-01

## 2023-10-19 ENCOUNTER — TELEPHONE ENCOUNTER (OUTPATIENT)
Dept: URBAN - METROPOLITAN AREA CLINIC 78 | Facility: CLINIC | Age: 63
End: 2023-10-19

## 2023-10-23 LAB
A/G RATIO: 1.7
ACE: 81
ACTIN (SMOOTH MUSCLE) ANTIBODY: 9
ALBUMIN: 4.3
ALKALINE PHOSPHATASE: 104
ALT (SGPT): 89
ANA DIRECT: NEGATIVE
AST (SGOT): 78
BILIRUBIN, TOTAL: 0.3
BUN/CREATININE RATIO: 18
BUN: 17
CALCIUM: 9.5
CARBON DIOXIDE, TOTAL: 23
CHLORIDE: 102
CREATININE: 0.97
EGFR: 66
GLOBULIN, TOTAL: 2.6
GLUCOSE: 113
HEMATOCRIT: 38.5
HEMOGLOBIN: 13
MCH: 32.5
MCHC: 33.8
MCV: 96
MITOCHONDRIAL (M2) ANTIBODY: 161.5
NRBC: (no result)
PLATELETS: 256
POTASSIUM: 3.8
PROTEIN, TOTAL: 6.9
RBC: 4
RDW: 11.6
SJOGREN'S ANTI-SS-A: <0.2
SJOGREN'S ANTI-SS-B: <0.2
SODIUM: 139
WBC: 5.7

## 2023-10-25 ENCOUNTER — LAB OUTSIDE AN ENCOUNTER (OUTPATIENT)
Dept: URBAN - METROPOLITAN AREA CLINIC 78 | Facility: CLINIC | Age: 63
End: 2023-10-25

## 2023-10-25 ENCOUNTER — OFFICE VISIT (OUTPATIENT)
Dept: URBAN - METROPOLITAN AREA CLINIC 78 | Facility: CLINIC | Age: 63
End: 2023-10-25
Payer: COMMERCIAL

## 2023-10-25 ENCOUNTER — DASHBOARD ENCOUNTERS (OUTPATIENT)
Age: 63
End: 2023-10-25

## 2023-10-25 VITALS
TEMPERATURE: 98.6 F | BODY MASS INDEX: 22.66 KG/M2 | RESPIRATION RATE: 14 BRPM | HEIGHT: 61 IN | OXYGEN SATURATION: 98 % | HEART RATE: 69 BPM | SYSTOLIC BLOOD PRESSURE: 158 MMHG | DIASTOLIC BLOOD PRESSURE: 92 MMHG | WEIGHT: 120 LBS

## 2023-10-25 DIAGNOSIS — H04.129 DRY EYES: ICD-10-CM

## 2023-10-25 DIAGNOSIS — R11.0 NAUSEA: ICD-10-CM

## 2023-10-25 DIAGNOSIS — R68.2 DRY MOUTH: ICD-10-CM

## 2023-10-25 DIAGNOSIS — K74.3 PRIMARY BILIARY CHOLANGITIS: ICD-10-CM

## 2023-10-25 DIAGNOSIS — R79.89 ELEVATED LFTS: ICD-10-CM

## 2023-10-25 DIAGNOSIS — R73.9 ELEVATED BLOOD SUGAR: ICD-10-CM

## 2023-10-25 PROCEDURE — 99214 OFFICE O/P EST MOD 30 MIN: CPT | Performed by: INTERNAL MEDICINE

## 2023-10-25 RX ORDER — URSODIOL 300 MG/1
TAKE 1 CAPSULE BY MOUTH  TWICE DAILY CAPSULE ORAL
Qty: 180 CAPSULE | Refills: 0 | Status: ON HOLD | COMMUNITY

## 2023-10-25 RX ORDER — BUPROPION HYDROCHLORIDE 300 MG/1
TAKE 1 TABLET (300 MG) BY ORAL ROUTE ONCE DAILY TABLET, EXTENDED RELEASE ORAL 1
Qty: 0 | Refills: 0 | Status: ON HOLD | COMMUNITY
Start: 1900-01-01

## 2023-10-25 RX ORDER — URSODIOL 300 MG/1
2 TABLETS IN AM AND 1 TABLET IN PM CAPSULE ORAL
OUTPATIENT

## 2023-10-25 RX ORDER — URSODIOL 300 MG/1
2 TABLETS IN AM AND 1 TABLET IN PM CAPSULE ORAL
Qty: 270 TABLET | Refills: 3 | Status: ACTIVE | COMMUNITY

## 2023-10-25 RX ORDER — AMLODIPINE BESYLATE 5 MG/1
UNK TABLET ORAL
Qty: 0 | Refills: 0 | Status: ON HOLD | COMMUNITY
Start: 1900-01-01

## 2023-10-25 NOTE — HPI-TODAY'S VISIT:
Patent is not feeling well SHe has been feeling nauseated She has dry and painful eyes She has not been sleeping well  Recent labs AST - elevated ALT - elevated BS - elevated AMA - 162

## 2023-11-07 ENCOUNTER — OFFICE VISIT (OUTPATIENT)
Dept: URBAN - METROPOLITAN AREA SURGERY CENTER 15 | Facility: SURGERY CENTER | Age: 63
End: 2023-11-07

## 2023-12-05 ENCOUNTER — TELEPHONE ENCOUNTER (OUTPATIENT)
Dept: URBAN - METROPOLITAN AREA CLINIC 78 | Facility: CLINIC | Age: 63
End: 2023-12-05

## 2023-12-06 ENCOUNTER — WEB ENCOUNTER (OUTPATIENT)
Dept: URBAN - METROPOLITAN AREA CLINIC 78 | Facility: CLINIC | Age: 63
End: 2023-12-06

## 2023-12-12 ENCOUNTER — TELEPHONE ENCOUNTER (OUTPATIENT)
Dept: URBAN - METROPOLITAN AREA CLINIC 78 | Facility: CLINIC | Age: 63
End: 2023-12-12

## 2023-12-12 ENCOUNTER — LAB OUTSIDE AN ENCOUNTER (OUTPATIENT)
Dept: URBAN - METROPOLITAN AREA CLINIC 78 | Facility: CLINIC | Age: 63
End: 2023-12-12

## 2023-12-14 ENCOUNTER — OFFICE VISIT (OUTPATIENT)
Dept: URBAN - METROPOLITAN AREA SURGERY CENTER 15 | Facility: SURGERY CENTER | Age: 63
End: 2023-12-14

## 2024-01-12 ENCOUNTER — TELEPHONE ENCOUNTER (OUTPATIENT)
Dept: URBAN - METROPOLITAN AREA CLINIC 78 | Facility: CLINIC | Age: 64
End: 2024-01-12

## 2024-01-12 RX ORDER — URSODIOL 300 MG/1
2 TABLETS IN AM AND 1 TABLET IN PM CAPSULE ORAL
Qty: 60 CAPSULE | Refills: 1

## 2024-01-16 ENCOUNTER — OFFICE VISIT (OUTPATIENT)
Dept: URBAN - METROPOLITAN AREA MEDICAL CENTER 28 | Facility: MEDICAL CENTER | Age: 64
End: 2024-01-16
Payer: COMMERCIAL

## 2024-01-16 DIAGNOSIS — R59.0 ABDOMINAL LYMPHADENOPATHY: ICD-10-CM

## 2024-01-16 DIAGNOSIS — R93.3 ABN FINDINGS-GI TRACT: ICD-10-CM

## 2024-01-16 DIAGNOSIS — K76.89 ABNORMAL FINDING ON LIVER FUNCTION: ICD-10-CM

## 2024-01-16 PROCEDURE — 43242 EGD US FINE NEEDLE BX/ASPIR: CPT | Performed by: INTERNAL MEDICINE

## 2024-01-16 RX ORDER — URSODIOL 300 MG/1
2 TABLETS IN AM AND 1 TABLET IN PM CAPSULE ORAL
Qty: 60 CAPSULE | Refills: 1 | Status: ACTIVE | COMMUNITY

## 2024-01-16 RX ORDER — AMLODIPINE BESYLATE 5 MG/1
UNK TABLET ORAL
Qty: 0 | Refills: 0 | Status: ON HOLD | COMMUNITY
Start: 1900-01-01

## 2024-01-16 RX ORDER — URSODIOL 300 MG/1
TAKE 1 CAPSULE BY MOUTH  TWICE DAILY CAPSULE ORAL
Qty: 180 CAPSULE | Refills: 0 | Status: ON HOLD | COMMUNITY

## 2024-01-16 RX ORDER — BUPROPION HYDROCHLORIDE 300 MG/1
TAKE 1 TABLET (300 MG) BY ORAL ROUTE ONCE DAILY TABLET, EXTENDED RELEASE ORAL 1
Qty: 0 | Refills: 0 | Status: ON HOLD | COMMUNITY
Start: 1900-01-01

## 2024-06-05 ENCOUNTER — TELEPHONE ENCOUNTER (OUTPATIENT)
Dept: URBAN - METROPOLITAN AREA CLINIC 78 | Facility: CLINIC | Age: 64
End: 2024-06-05

## 2024-06-19 LAB
ACTIN (SMOOTH MUSCLE) ANTIBODY: 8
ALBUMIN: 4.2
ALKALINE PHOSPHATASE: 119
ALT (SGPT): 37
ANA DIRECT: NEGATIVE
AST (SGOT): 42
BASO (ABSOLUTE): 0.1
BASOS: 1
BILIRUBIN, TOTAL: 0.3
BUN/CREATININE RATIO: 24
BUN: 22
CALCIUM: 9.9
CARBON DIOXIDE, TOTAL: 25
CHLORIDE: 102
CREATININE: 0.93
EGFR: 70
EOS (ABSOLUTE): 0.2
EOS: 4
GLOBULIN, TOTAL: 2.7
GLUCOSE: 81
HEMATOCRIT: 40
HEMATOLOGY COMMENTS:: (no result)
HEMOGLOBIN: 13.4
IMMATURE CELLS: (no result)
IMMATURE GRANS (ABS): 0
IMMATURE GRANULOCYTES: 0
LYMPHS (ABSOLUTE): 1.8
LYMPHS: 33
MCH: 32.1
MCHC: 33.5
MCV: 96
MITOCHONDRIAL (M2) ANTIBODY: 191.9
MONOCYTES(ABSOLUTE): 0.7
MONOCYTES: 13
NEUTROPHILS (ABSOLUTE): 2.6
NEUTROPHILS: 49
NRBC: (no result)
PLATELETS: 268
POTASSIUM: 4.7
PROTEIN, TOTAL: 6.9
RBC: 4.17
RDW: 11.7
SJOGREN'S ANTI-SS-A: <0.2
SJOGREN'S ANTI-SS-B: <0.2
SODIUM: 139
T4,FREE(DIRECT): 0.97
TSH: 1.65
WBC: 5.3

## 2024-10-02 ENCOUNTER — OFFICE VISIT (OUTPATIENT)
Dept: URBAN - METROPOLITAN AREA CLINIC 78 | Facility: CLINIC | Age: 64
End: 2024-10-02
Payer: COMMERCIAL

## 2024-10-02 ENCOUNTER — LAB OUTSIDE AN ENCOUNTER (OUTPATIENT)
Dept: URBAN - METROPOLITAN AREA CLINIC 78 | Facility: CLINIC | Age: 64
End: 2024-10-02

## 2024-10-02 VITALS
HEART RATE: 61 BPM | DIASTOLIC BLOOD PRESSURE: 88 MMHG | RESPIRATION RATE: 14 BRPM | HEIGHT: 61 IN | WEIGHT: 120 LBS | TEMPERATURE: 98.11 F | SYSTOLIC BLOOD PRESSURE: 150 MMHG | BODY MASS INDEX: 22.66 KG/M2

## 2024-10-02 DIAGNOSIS — R79.89 ELEVATED LFTS: ICD-10-CM

## 2024-10-02 DIAGNOSIS — H04.129 DRY EYES: ICD-10-CM

## 2024-10-02 DIAGNOSIS — R68.2 DRY MOUTH: ICD-10-CM

## 2024-10-02 DIAGNOSIS — K74.3 PRIMARY BILIARY CHOLANGITIS: ICD-10-CM

## 2024-10-02 PROCEDURE — 99214 OFFICE O/P EST MOD 30 MIN: CPT | Performed by: INTERNAL MEDICINE

## 2024-10-02 RX ORDER — BUPROPION HYDROCHLORIDE 300 MG/1
TAKE 1 TABLET (300 MG) BY ORAL ROUTE ONCE DAILY TABLET, EXTENDED RELEASE ORAL 1
Qty: 0 | Refills: 0 | Status: ON HOLD | COMMUNITY
Start: 1900-01-01

## 2024-10-02 RX ORDER — AMLODIPINE BESYLATE 10 MG/1
1 TABLET TABLET ORAL ONCE A DAY
Status: ACTIVE | COMMUNITY

## 2024-10-02 RX ORDER — URSODIOL 300 MG/1
2 TABLETS IN AM AND 1 TABLET IN PM CAPSULE ORAL
Qty: 60 CAPSULE | Refills: 1 | Status: ACTIVE | COMMUNITY

## 2024-10-02 RX ORDER — URSODIOL 300 MG/1
TAKE 1 CAPSULE BY MOUTH  TWICE DAILY CAPSULE ORAL
Qty: 180 CAPSULE | Refills: 0 | Status: ON HOLD | COMMUNITY

## 2024-10-02 RX ORDER — URSODIOL 300 MG/1
1 CAPSULE ORALLY TWICE A DAY CAPSULE ORAL
Qty: 180 | Refills: 3

## 2024-10-02 NOTE — HPI-TODAY'S VISIT:
Patent is not feeling well SHe has been feeling unwell and has a feeling of presyncope - as if not getting oxygen to her head She was found to have elevated BP She then had a CT of the Brain - it was nl She had a calcium scoring done - she has "mild elevation" - she was rec  to start a statin  MRI liver in 11/2023 showed some LN EUS in Jan 24 showed benign LNs on FNA

## 2024-12-04 ENCOUNTER — OFFICE VISIT (OUTPATIENT)
Dept: URBAN - METROPOLITAN AREA CLINIC 78 | Facility: CLINIC | Age: 64
End: 2024-12-04

## 2024-12-04 RX ORDER — BUPROPION HYDROCHLORIDE 300 MG/1
TAKE 1 TABLET (300 MG) BY ORAL ROUTE ONCE DAILY TABLET, EXTENDED RELEASE ORAL 1
Qty: 0 | Refills: 0 | Status: ON HOLD | COMMUNITY
Start: 1900-01-01

## 2024-12-04 RX ORDER — AMLODIPINE BESYLATE 10 MG/1
1 TABLET TABLET ORAL ONCE A DAY
Status: ACTIVE | COMMUNITY

## 2024-12-04 RX ORDER — URSODIOL 300 MG/1
TAKE 1 CAPSULE BY MOUTH  TWICE DAILY CAPSULE ORAL
Qty: 180 CAPSULE | Refills: 0 | Status: ON HOLD | COMMUNITY

## 2024-12-04 RX ORDER — URSODIOL 300 MG/1
1 CAPSULE ORALLY TWICE A DAY CAPSULE ORAL
Qty: 180 | Refills: 3 | Status: ACTIVE | COMMUNITY

## 2025-04-10 ENCOUNTER — TELEPHONE ENCOUNTER (OUTPATIENT)
Dept: URBAN - METROPOLITAN AREA CLINIC 78 | Facility: CLINIC | Age: 65
End: 2025-04-10

## 2025-04-18 ENCOUNTER — OFFICE VISIT (OUTPATIENT)
Dept: URBAN - METROPOLITAN AREA CLINIC 78 | Facility: CLINIC | Age: 65
End: 2025-04-18

## 2025-04-18 RX ORDER — BUPROPION HYDROCHLORIDE 300 MG/1
TAKE 1 TABLET (300 MG) BY ORAL ROUTE ONCE DAILY TABLET, EXTENDED RELEASE ORAL 1
Qty: 0 | Refills: 0 | Status: ON HOLD | COMMUNITY
Start: 1900-01-01

## 2025-04-18 RX ORDER — URSODIOL 300 MG/1
1 CAPSULE ORALLY TWICE A DAY CAPSULE ORAL
Qty: 180 | Refills: 3 | Status: ACTIVE | COMMUNITY

## 2025-04-18 RX ORDER — URSODIOL 300 MG/1
TAKE 1 CAPSULE BY MOUTH  TWICE DAILY CAPSULE ORAL
Qty: 180 CAPSULE | Refills: 0 | Status: ON HOLD | COMMUNITY

## 2025-04-18 RX ORDER — AMLODIPINE BESYLATE 10 MG/1
1 TABLET TABLET ORAL ONCE A DAY
Status: ACTIVE | COMMUNITY

## 2025-04-21 LAB
ACE: 104
ACTIN (SMOOTH MUSCLE) ANTIBODY: 9
ALBUMIN: 4.5
ALKALINE PHOSPHATASE: 148
ALT (SGPT): 48
ANA DIRECT: NEGATIVE
AST (SGOT): 53
BASO (ABSOLUTE): 0.1
BASOS: 1
BILIRUBIN, TOTAL: <0.2
BUN/CREATININE RATIO: 27
BUN: 26
CALCIUM: 9.8
CARBON DIOXIDE, TOTAL: 21
CHLORIDE: 100
CREATININE: 0.97
EGFR: 66
EOS (ABSOLUTE): 0.3
EOS: 5
GLOBULIN, TOTAL: 2.7
GLUCOSE: 88
HEMATOCRIT: 39.2
HEMATOLOGY COMMENTS:: (no result)
HEMOGLOBIN: 13.3
IMMATURE CELLS: (no result)
IMMATURE GRANS (ABS): 0
IMMATURE GRANULOCYTES: 1
LYMPHS (ABSOLUTE): 1.7
LYMPHS: 32
MCH: 32
MCHC: 33.9
MCV: 94
MITOCHONDRIAL (M2) ANTIBODY: 175.1
MONOCYTES(ABSOLUTE): 0.7
MONOCYTES: 13
NEUTROPHILS (ABSOLUTE): 2.6
NEUTROPHILS: 48
NRBC: (no result)
PLATELETS: 281
POTASSIUM: 4.6
PROTEIN, TOTAL: 7.2
PTH, INTACT: 21
RBC: 4.16
RDW: 11.8
SJOGREN'S ANTI-SS-A: <0.2
SJOGREN'S ANTI-SS-B: <0.2
SODIUM: 136
WBC: 5.4

## 2025-05-05 ENCOUNTER — OFFICE VISIT (OUTPATIENT)
Dept: URBAN - METROPOLITAN AREA CLINIC 78 | Facility: CLINIC | Age: 65
End: 2025-05-05
Payer: COMMERCIAL

## 2025-05-05 DIAGNOSIS — E83.52 HYPERCALCEMIA: ICD-10-CM

## 2025-05-05 DIAGNOSIS — K74.3 PRIMARY BILIARY CHOLANGITIS: ICD-10-CM

## 2025-05-05 DIAGNOSIS — H04.129 DRY EYES: ICD-10-CM

## 2025-05-05 DIAGNOSIS — R68.2 DRY MOUTH: ICD-10-CM

## 2025-05-05 DIAGNOSIS — R79.89 ELEVATED LFTS: ICD-10-CM

## 2025-05-05 DIAGNOSIS — R74.8 ELEVATED ALKALINE PHOSPHATASE LEVEL: ICD-10-CM

## 2025-05-05 PROCEDURE — 99214 OFFICE O/P EST MOD 30 MIN: CPT | Performed by: INTERNAL MEDICINE

## 2025-05-05 RX ORDER — URSODIOL 300 MG/1
1 CAPSULE ORALLY TWICE A DAY CAPSULE ORAL
OUTPATIENT

## 2025-05-05 RX ORDER — URSODIOL 300 MG/1
TAKE 1 CAPSULE BY MOUTH  TWICE DAILY CAPSULE ORAL
Qty: 180 CAPSULE | Refills: 0 | Status: ON HOLD | COMMUNITY

## 2025-05-05 RX ORDER — URSODIOL 300 MG/1
1 CAPSULE ORALLY TWICE A DAY CAPSULE ORAL
Qty: 180 | Refills: 3 | Status: ACTIVE | COMMUNITY

## 2025-05-05 RX ORDER — OBETICHOLIC ACID 5 MG/1
1 TABLET TABLET, FILM COATED ORAL DAILY
Qty: 90 TABLET | Refills: 1 | OUTPATIENT
Start: 2025-05-05

## 2025-05-05 RX ORDER — AMLODIPINE BESYLATE 10 MG/1
1 TABLET TABLET ORAL ONCE A DAY
Status: ACTIVE | COMMUNITY

## 2025-05-05 RX ORDER — BUPROPION HYDROCHLORIDE 300 MG/1
TAKE 1 TABLET (300 MG) BY ORAL ROUTE ONCE DAILY TABLET, EXTENDED RELEASE ORAL 1
Qty: 0 | Refills: 0 | Status: ON HOLD | COMMUNITY
Start: 1900-01-01

## 2025-05-05 NOTE — HPI-TODAY'S VISIT:
Patient is here in f/u  Denies fatigue  Did have an episode of headached a few weeks ago She had a CT scan of the brain - it was wnl  Recent labs:  AMA - 175.1 Alk Phos - 148 AST - 53 ALT - 48 ACE - 104

## 2025-08-05 ENCOUNTER — LAB OUTSIDE AN ENCOUNTER (OUTPATIENT)
Dept: URBAN - METROPOLITAN AREA CLINIC 78 | Facility: CLINIC | Age: 65
End: 2025-08-05

## 2025-08-11 ENCOUNTER — OFFICE VISIT (OUTPATIENT)
Dept: URBAN - METROPOLITAN AREA CLINIC 78 | Facility: CLINIC | Age: 65
End: 2025-08-11